# Patient Record
Sex: FEMALE
[De-identification: names, ages, dates, MRNs, and addresses within clinical notes are randomized per-mention and may not be internally consistent; named-entity substitution may affect disease eponyms.]

---

## 2018-07-10 ENCOUNTER — HOSPITAL ENCOUNTER (EMERGENCY)
Dept: HOSPITAL 88 - ER | Age: 23
LOS: 1 days | Discharge: HOME | End: 2018-07-11
Payer: COMMERCIAL

## 2018-07-10 VITALS — WEIGHT: 120 LBS | BODY MASS INDEX: 22.66 KG/M2 | HEIGHT: 61 IN

## 2018-07-10 DIAGNOSIS — R07.89: Primary | ICD-10-CM

## 2018-07-10 PROCEDURE — 99283 EMERGENCY DEPT VISIT LOW MDM: CPT

## 2018-07-10 PROCEDURE — 71046 X-RAY EXAM CHEST 2 VIEWS: CPT

## 2018-07-10 PROCEDURE — 81025 URINE PREGNANCY TEST: CPT

## 2018-07-11 VITALS — DIASTOLIC BLOOD PRESSURE: 72 MMHG | SYSTOLIC BLOOD PRESSURE: 118 MMHG

## 2018-07-11 NOTE — DIAGNOSTIC IMAGING REPORT
ABDOMEN COMP INCL UPR or DECUB



Clinical history: Bloating, gassy

Technique: AP view abdomen, supine and upright

Comparison: None



Findings:

Abdomen: No dilated loops of small or large bowel. Mild stool is seen

throughout the colon. No free air.



Other: No acute findings.



Impression:

No evidence of obstruction or free air. 



Signed by: Dr Fani Chance MD on 7/11/2018 1:57 AM

## 2018-07-11 NOTE — DIAGNOSTIC IMAGING REPORT
CHEST 2 VIEWS,    



Technique: CHEST 2 VIEWS

Comparison: None

Clinical history:  Pleuritic chest pain 



DISCUSSION:

Normal appearance of the heart, mediastinum, lungs and pleural spaces.



IMPRESSION:

No acute abnormality



Signed by: Dr Fani Chance MD on 7/11/2018 1:57 AM